# Patient Record
Sex: MALE | Race: WHITE | NOT HISPANIC OR LATINO | Employment: FULL TIME | URBAN - METROPOLITAN AREA
[De-identification: names, ages, dates, MRNs, and addresses within clinical notes are randomized per-mention and may not be internally consistent; named-entity substitution may affect disease eponyms.]

---

## 2021-07-01 ENCOUNTER — OFFICE VISIT (OUTPATIENT)
Dept: INTERNAL MEDICINE | Facility: CLINIC | Age: 26
End: 2021-07-01

## 2021-07-01 VITALS
OXYGEN SATURATION: 99 % | RESPIRATION RATE: 16 BRPM | HEART RATE: 86 BPM | DIASTOLIC BLOOD PRESSURE: 82 MMHG | TEMPERATURE: 96.6 F | SYSTOLIC BLOOD PRESSURE: 130 MMHG | HEIGHT: 71 IN

## 2021-07-01 DIAGNOSIS — J02.9 SORE THROAT: Primary | ICD-10-CM

## 2021-07-01 LAB
EXPIRATION DATE: NORMAL
EXPIRATION DATE: NORMAL
HETEROPH AB SER QL LA: NEGATIVE
INTERNAL CONTROL: NORMAL
INTERNAL CONTROL: NORMAL
Lab: NORMAL
Lab: NORMAL
S PYO RRNA THROAT QL PROBE: NEGATIVE

## 2021-07-01 PROCEDURE — 87651 STREP A DNA AMP PROBE: CPT | Performed by: INTERNAL MEDICINE

## 2021-07-01 PROCEDURE — 86308 HETEROPHILE ANTIBODY SCREEN: CPT | Performed by: INTERNAL MEDICINE

## 2021-07-01 PROCEDURE — 99203 OFFICE O/P NEW LOW 30 MIN: CPT | Performed by: INTERNAL MEDICINE

## 2021-07-01 RX ORDER — PROCHLORPERAZINE 25 MG/1
SUPPOSITORY RECTAL
COMMUNITY
Start: 2021-02-04

## 2021-07-01 NOTE — PROGRESS NOTES
"Chief Complaint  Sore Throat and Fever    Gume Cazares presents to [facility] for sore throat and fever. Started 2 days ago with sore throat and then developed fever, highest 101.3 this am. No loss of smell, loss of taste, cough, SOB, chest pain.   Pt has no known COVID exposures and has been fully vaccinated.     Objective   Vital Signs:     /82   Pulse 86   Temp 96.6 °F (35.9 °C)   Resp 16   Ht 180.3 cm (71\")   SpO2 99%     Physical Exam  Vitals and nursing note reviewed.   Constitutional:       General: He is not in acute distress.     Appearance: Normal appearance.   HENT:      Head: Normocephalic and atraumatic.      Right Ear: Ear canal and external ear normal. No middle ear effusion.      Left Ear: Ear canal and external ear normal.  No middle ear effusion.      Nose: Congestion present. No nasal deformity.      Right Turbinates: Swollen.      Left Turbinates: Swollen.      Mouth/Throat:      Mouth: Mucous membranes are moist.      Pharynx: Posterior oropharyngeal erythema present. No oropharyngeal exudate.      Tonsils: No tonsillar exudate. 2+ on the right. 2+ on the left.      Comments: + cobblestoning, mild uvular petechiae  Eyes:      General:         Right eye: No discharge.         Left eye: No discharge.      Extraocular Movements: Extraocular movements intact.      Conjunctiva/sclera: Conjunctivae normal.      Pupils: Pupils are equal, round, and reactive to light.   Cardiovascular:      Rate and Rhythm: Normal rate and regular rhythm.      Heart sounds: No murmur heard.     Pulmonary:      Effort: Pulmonary effort is normal. No respiratory distress.      Breath sounds: Normal breath sounds. No wheezing or rhonchi.   Abdominal:      General: Bowel sounds are normal. There is no distension.      Palpations: Abdomen is soft. There is no mass.      Tenderness: There is no abdominal tenderness.   Musculoskeletal:         " General: Normal range of motion.      Cervical back: Normal range of motion and neck supple. No tenderness.   Lymphadenopathy:      Cervical: No cervical adenopathy.   Skin:     General: Skin is warm and dry.      Capillary Refill: Capillary refill takes less than 2 seconds.      Findings: No rash.   Neurological:      General: No focal deficit present.      Mental Status: He is alert and oriented to person, place, and time.   Psychiatric:         Mood and Affect: Mood normal.         Behavior: Behavior normal.        Lab Results   Component Value Date    RAPSCRN Negative 07/01/2021       Monospot: negative    Result Review : : None    Assessment and Plan      Diagnoses and all orders for this visit:    1. 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    Patient was given instructions and counseling regarding his condition or for health maintenance advice. Please see specific information pulled into the AVS if appropriate.     Prachi Rodriguez MD  [facility]  Phone: [phone]  Fax: [phone]

## 2021-12-07 ENCOUNTER — TELEMEDICINE (OUTPATIENT)
Dept: INTERNAL MEDICINE | Facility: CLINIC | Age: 26
End: 2021-12-07

## 2021-12-07 ENCOUNTER — TELEPHONE (OUTPATIENT)
Dept: INTERNAL MEDICINE | Facility: CLINIC | Age: 26
End: 2021-12-07

## 2021-12-07 DIAGNOSIS — H10.31 ACUTE CONJUNCTIVITIS OF RIGHT EYE, UNSPECIFIED ACUTE CONJUNCTIVITIS TYPE: ICD-10-CM

## 2021-12-07 DIAGNOSIS — R05.9 COUGH: Primary | ICD-10-CM

## 2021-12-07 PROCEDURE — 99213 OFFICE O/P EST LOW 20 MIN: CPT | Performed by: INTERNAL MEDICINE

## 2021-12-07 RX ORDER — ALBUTEROL SULFATE 90 UG/1
2 AEROSOL, METERED RESPIRATORY (INHALATION) EVERY 4 HOURS PRN
Qty: 8 G | Refills: 1 | Status: SHIPPED | OUTPATIENT
Start: 2021-12-07

## 2021-12-07 RX ORDER — POLYMYXIN B SULFATE AND TRIMETHOPRIM 1; 10000 MG/ML; [USP'U]/ML
1 SOLUTION OPHTHALMIC EVERY 4 HOURS
Qty: 10 ML | Refills: 0 | Status: SHIPPED | OUTPATIENT
Start: 2021-12-07

## 2021-12-07 NOTE — PROGRESS NOTES
Chief Complaint  Cough, congestion    You have chosen to receive care through a telehealth visit.  Do you consent to use a video/audio connection for your medical care today? Yes    Subjective          Norman Cazares presents to [facility] for cough and congestion. Felt like he started to get sick last month and has completed 3 rounds of abx. Thought he was getting better but he finished his most recent abx 7 days ago and is now starting to feel poorly again. He has developed pink eye in his R eye, dry cough, and sore throat. No fever or chills.   He has taken Amoxicillin from his endocrinologist, then doxycycline, then last course was Augmentin.   He had COVID test with his initial [facility] appt which was negative. Was also tested at work at this time and was negative. CXR has been done which was normal.     Objective     Physical Exam  Constitutional:       General: He is not in acute distress.     Appearance: Normal appearance.   HENT:      Head: Normocephalic and atraumatic.   Eyes:      General:         Right eye: No discharge.      Conjunctiva/sclera:      Right eye: Right conjunctiva is injected. No exudate.  Pulmonary:      Effort: Pulmonary effort is normal. No respiratory distress.   Neurological:      General: No focal deficit present.      Mental Status: He is alert. Mental status is at baseline.   Psychiatric:         Mood and Affect: Mood normal.         Behavior: Behavior normal.         Thought Content: Thought content normal.          Result Review : : None     Assessment and Plan      Diagnoses and all orders for this visit:    1. Cough (Primary)  -     albuterol sulfate  (90 Base) MCG/ACT inhaler; Inhale 2 puffs Every 4 (Four) Hours As Needed for Wheezing or Shortness of Air (cough).  Dispense: 8 g; Refill: 1  -     Mononucleosis Screen; Future  -     Bordetella Pertussis / Parapertussis PCR - Swab, Nasopharynx; Future    2. Acute conjunctivitis  of right eye, unspecified acute conjunctivitis type  -     trimethoprim-polymyxin b (Polytrim) 85009-5.1 UNIT/ML-% ophthalmic solution; Administer 1 drop to the right eye Every 4 (Four) Hours.  Dispense: 10 mL; Refill: 0      Pt with recurrent cough that has now been treated with 3 rounds of abx and continues to recur. Has had negative COVID testing and negative chest imaging during this time. Afebrile and otherwise very well appearing, so suspect a repeat CXR would be negative at this time. Will have pt come to office tomorrow for lab appt to have monospot and pertussis PCR swab done given persistence of symptoms, description of unilateral sore throat and LAD. Will send in new eye drops for conjunctivitis, but discussed that could also be allergic in nature given recurrence after topical and systemic antibiotics, so if this does not clear this up may want to consider antihistamine eye drop trial. Will also send in alb for empiric treatment of RAD at home. Pt has never been dx with asthma in the past, but now having persistent, spastic, dry cough worse at night that has been non-responsive to abx. Would love to get PFT in office but not able to do this procedure due to COVID, so will trial alb treatments at home to see if this leads to symptomatic improvements. Follow up if not improving.     Follow Up   Return if symptoms worsen or fail to improve.    Patient was given instructions and counseling regarding his condition or for health maintenance advice. Please see specific information pulled into the AVS if appropriate.     Prachi Rodriguez MD  [facility] [place]  Phone: [phone]  Fax: [phone]

## 2021-12-07 NOTE — TELEPHONE ENCOUNTER
What is a warm transfer? But yes that is fine or they can be in person since he will likely need a COVID and/or Flu swab

## 2021-12-07 NOTE — TELEPHONE ENCOUNTER
PT MOTHER CALLED TO SCHEDULE SON WITH SOME SAME DAY SYMPTOMS SUCH AS EARACHE, COUGH, CONGESTION, FEVER. WAS UNABLE TO FIND ANY SAME DAY SLOTS ATTEMPTED TO WARM TRANSFER.

## 2021-12-08 ENCOUNTER — TELEPHONE (OUTPATIENT)
Dept: INTERNAL MEDICINE | Facility: CLINIC | Age: 26
End: 2021-12-08

## 2021-12-08 ENCOUNTER — CLINICAL SUPPORT (OUTPATIENT)
Dept: INTERNAL MEDICINE | Facility: CLINIC | Age: 26
End: 2021-12-08

## 2021-12-08 DIAGNOSIS — R05.9 COUGH: ICD-10-CM

## 2021-12-08 LAB
EXPIRATION DATE: NORMAL
HETEROPH AB SER QL LA: NEGATIVE
INTERNAL CONTROL: NORMAL
Lab: NORMAL

## 2021-12-08 PROCEDURE — 86308 HETEROPHILE ANTIBODY SCREEN: CPT | Performed by: INTERNAL MEDICINE

## 2021-12-14 LAB
B PARAPERT DNA UPPER RESP QL NAA+PROBE: NEGATIVE
B PERT DNA UPPER RESP QL NAA+PROBE: NEGATIVE

## 2021-12-20 ENCOUNTER — TELEPHONE (OUTPATIENT)
Dept: INTERNAL MEDICINE | Facility: CLINIC | Age: 26
End: 2021-12-20

## 2021-12-20 NOTE — TELEPHONE ENCOUNTER
Caller: Norman Cazares    Relationship to patient: Self    Best call back number: [phone]    Chief complaint: PATIENT STATES THAT HE IS STILL NOT BETTER FROM PREVIOUS APPOINTMENT AND NOW HE CAN NOT HEAR OUT OF RT EAR/SORETHROAT/MUCUS    Type of visit: OFFICE VISIT    Requested date: ASAP    If rescheduling, when is the original appointment: N/A     Additional notes:PLEASE CALL

## 2021-12-20 NOTE — TELEPHONE ENCOUNTER
Unfortunately now probably needs to go to urgent care, with it being the week of bar we are completely booked and actually overbooking, so he will be seen sooner there and would like him to be re-evaluated

## 2021-12-22 ENCOUNTER — OFFICE VISIT (OUTPATIENT)
Dept: INTERNAL MEDICINE | Facility: CLINIC | Age: 26
End: 2021-12-22

## 2021-12-22 VITALS
WEIGHT: 190 LBS | RESPIRATION RATE: 16 BRPM | OXYGEN SATURATION: 98 % | HEIGHT: 71 IN | HEART RATE: 77 BPM | BODY MASS INDEX: 26.6 KG/M2 | TEMPERATURE: 97.1 F

## 2021-12-22 DIAGNOSIS — J32.2 CHRONIC ETHMOIDAL SINUSITIS: Primary | ICD-10-CM

## 2021-12-22 DIAGNOSIS — R05.3 CHRONIC COUGH: ICD-10-CM

## 2021-12-22 PROCEDURE — 99214 OFFICE O/P EST MOD 30 MIN: CPT | Performed by: INTERNAL MEDICINE

## 2021-12-22 RX ORDER — CEFDINIR 300 MG/1
300 CAPSULE ORAL 2 TIMES DAILY
Qty: 28 CAPSULE | Refills: 1 | Status: SHIPPED | OUTPATIENT
Start: 2021-12-22 | End: 2022-01-05

## 2021-12-22 RX ORDER — INSULIN LISPRO-AABC 100 [IU]/ML
INJECTION, SOLUTION INTRAVENOUS; SUBCUTANEOUS
COMMUNITY
Start: 2021-09-15

## 2021-12-22 NOTE — PROGRESS NOTES
"Chief Complaint  Sore Throat (X 1-2 MONTHS NO FEVER ) and Earache    Subjective          Norman Cazares presents to [facility]  History of Present Illness  Nice gentleman with history of persistent cough for about 2 months.  This started out as an upper respiratory illness was seen in the immediate care center and started on amoxicillin.  Subsequently took doxycycline and then Augmentin.  He may have felt a little bit of improvement but the symptoms relapsed.  He never had asthma as a child.  Very healthy gentleman with type 1 diabetes.  Some ear pain and muffled hearing.  Mild sore throat seems to get worse at night  Objective   Vital Signs:   Pulse 77   Temp 97.1 °F (36.2 °C) (Temporal)   Resp 16   Ht 180.3 cm (71\")   Wt 86.2 kg (190 lb)   SpO2 98%   BMI 26.50 kg/m²     Physical Exam  Vitals and nursing note reviewed.   Constitutional:       Appearance: Normal appearance.   HENT:      Head: Normocephalic and atraumatic.      Right Ear: Ear canal normal.      Left Ear: Ear canal normal.      Ears:      Comments: Bilateral erythema of the tympanic membranes with significant effusions bilaterally, right greater than left  Cardiovascular:      Rate and Rhythm: Normal rate and regular rhythm.   Pulmonary:      Effort: Pulmonary effort is normal.      Breath sounds: Normal breath sounds.   Abdominal:      General: Abdomen is flat.      Palpations: Abdomen is soft.   Musculoskeletal:         General: No swelling or deformity.      Cervical back: Neck supple.      Right lower leg: No edema.      Left lower leg: No edema.   Skin:     General: Skin is warm.      Capillary Refill: Capillary refill takes less than 2 seconds.      Findings: No rash.   Neurological:      General: No focal deficit present.      Mental Status: He is alert and oriented to person, place, and time.        Result Review : Previous notes                Assessment and Plan bilateral middle ear " effusion and chronic otitis.  Very likely ethmoid/sphenoid sinusitis based on the duration.  His lungs are clear and no real history of asthma.  He had a chest x-ray done that was normal.  He had a little bit of improvement on the Augmentin but symptoms relapsed.  I think we need to treat him longer and we discussed this.  Cefdinir prescription at least 2 weeks and may need longer.  We talked about a CT scan of the sinuses certainly if it does not improve next week we can schedule that.  ENT consult if no improvement.  He does have diabetes history but I think a fungal infection is less likely but something to keep in mind.  Did not see anything concerning on his nasal exam.  There are no diagnoses linked to this encounter.    Follow Up   No follow-ups on file.  Patient was given instructions and counseling regarding his condition or for health maintenance advice. Please see specific information pulled into the AVS if appropriate.